# Patient Record
Sex: FEMALE | Race: WHITE | HISPANIC OR LATINO | URBAN - METROPOLITAN AREA
[De-identification: names, ages, dates, MRNs, and addresses within clinical notes are randomized per-mention and may not be internally consistent; named-entity substitution may affect disease eponyms.]

---

## 2017-12-07 ENCOUNTER — EMERGENCY (EMERGENCY)
Facility: HOSPITAL | Age: 57
LOS: 1 days | Discharge: HOME | End: 2017-12-07
Attending: EMERGENCY MEDICINE
Payer: COMMERCIAL

## 2017-12-07 VITALS — WEIGHT: 141.1 LBS | HEIGHT: 62 IN

## 2017-12-07 VITALS
RESPIRATION RATE: 16 BRPM | SYSTOLIC BLOOD PRESSURE: 123 MMHG | TEMPERATURE: 98 F | OXYGEN SATURATION: 98 % | DIASTOLIC BLOOD PRESSURE: 71 MMHG | HEART RATE: 76 BPM

## 2017-12-07 PROCEDURE — 73060 X-RAY EXAM OF HUMERUS: CPT

## 2017-12-07 PROCEDURE — 73060 X-RAY EXAM OF HUMERUS: CPT | Mod: 26,RT

## 2017-12-07 PROCEDURE — 99283 EMERGENCY DEPT VISIT LOW MDM: CPT | Mod: 25

## 2017-12-07 PROCEDURE — 99283 EMERGENCY DEPT VISIT LOW MDM: CPT

## 2017-12-07 RX ORDER — IBUPROFEN 200 MG
600 TABLET ORAL ONCE
Qty: 0 | Refills: 0 | Status: COMPLETED | OUTPATIENT
Start: 2017-12-07 | End: 2017-12-07

## 2017-12-07 RX ADMIN — Medication 600 MILLIGRAM(S): at 14:52

## 2017-12-07 RX ADMIN — Medication 600 MILLIGRAM(S): at 14:24

## 2017-12-07 NOTE — ED PROVIDER NOTE - ATTENDING CONTRIBUTION TO CARE
58 y/o F pt presents with traumatic R upper extremity pain. Pt is well-appearing. R arm is neurovascularly intact, no signs of dislocation, low-suspicion for fracture. Likely contusion. Will do X-Ray, give Ibuprofen, give icepack, and reassess.

## 2017-12-07 NOTE — ED PROVIDER NOTE - UPPER EXTREMITY EXAM, RIGHT
mid-humerus tenderness without swelling or ecchymosis; FROM of R wrist and R elbow, slightly limited ROM of R shoulder; capillary refill <2 seconds, radial/ulnar pulses intact 2+
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
## 2017-12-07 NOTE — ED PROVIDER NOTE - PROGRESS NOTE DETAILS
Xray shows no signs of fracture. Will treat with IBU, ice, elevate and follow up with PMD in 1-2 days. Pt is well appearing walking with steady gait, stable for discharge and follow up without fail with medical doctor. I had a detailed discussion with the patient and/or guardian regarding the historical points, exam findings, and any diagnostic results supporting the discharge diagnosis. Pt educated on care and need for follow up. Strict return instructions and red flag signs and symptoms discussed with patient. Questions answered. Pt shows understanding of discharge information and agrees to follow.

## 2017-12-07 NOTE — ED PROVIDER NOTE - CONDUCTED A DETAILED DISCUSSION WITH PATIENT AND/OR GUARDIAN REGARDING, MDM
radiology results/need for outpatient follow-up need for outpatient follow-up/radiology results/return to ED if symptoms worsen, persist or questions arise
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
